# Patient Record
Sex: MALE | Race: WHITE | Employment: UNEMPLOYED | URBAN - METROPOLITAN AREA
[De-identification: names, ages, dates, MRNs, and addresses within clinical notes are randomized per-mention and may not be internally consistent; named-entity substitution may affect disease eponyms.]

---

## 2021-01-26 ENCOUNTER — HOSPITAL ENCOUNTER (EMERGENCY)
Facility: CLINIC | Age: 46
Discharge: HOME | End: 2021-01-26
Attending: EMERGENCY MEDICINE | Admitting: EMERGENCY MEDICINE
Payer: COMMERCIAL

## 2021-01-26 VITALS
SYSTOLIC BLOOD PRESSURE: 131 MMHG | TEMPERATURE: 97.5 F | OXYGEN SATURATION: 100 % | RESPIRATION RATE: 16 BRPM | DIASTOLIC BLOOD PRESSURE: 89 MMHG | WEIGHT: 164 LBS | HEART RATE: 71 BPM

## 2021-01-26 DIAGNOSIS — I70.201 POPLITEAL ARTERY OCCLUSION, RIGHT (H): ICD-10-CM

## 2021-01-26 DIAGNOSIS — M79.662 PAIN OF LEFT LOWER LEG: ICD-10-CM

## 2021-01-26 LAB
ANION GAP SERPL CALCULATED.3IONS-SCNC: 4 MMOL/L (ref 3–14)
BASOPHILS # BLD AUTO: 0.1 10E9/L (ref 0–0.2)
BASOPHILS NFR BLD AUTO: 0.9 %
BUN SERPL-MCNC: 18 MG/DL (ref 7–30)
CALCIUM SERPL-MCNC: 9.4 MG/DL (ref 8.5–10.1)
CHLORIDE SERPL-SCNC: 103 MMOL/L (ref 94–109)
CO2 SERPL-SCNC: 29 MMOL/L (ref 20–32)
CREAT SERPL-MCNC: 0.94 MG/DL (ref 0.66–1.25)
DIFFERENTIAL METHOD BLD: ABNORMAL
EOSINOPHIL # BLD AUTO: 0.1 10E9/L (ref 0–0.7)
EOSINOPHIL NFR BLD AUTO: 0.7 %
ERYTHROCYTE [DISTWIDTH] IN BLOOD BY AUTOMATED COUNT: 12 % (ref 10–15)
GFR SERPL CREATININE-BSD FRML MDRD: >90 ML/MIN/{1.73_M2}
GLUCOSE SERPL-MCNC: 90 MG/DL (ref 70–99)
HCT VFR BLD AUTO: 51.4 % (ref 40–53)
HGB BLD-MCNC: 17.5 G/DL (ref 13.3–17.7)
IMM GRANULOCYTES # BLD: 0 10E9/L (ref 0–0.4)
IMM GRANULOCYTES NFR BLD: 0.3 %
INR PPP: 2.36 (ref 0.86–1.14)
LYMPHOCYTES # BLD AUTO: 3 10E9/L (ref 0.8–5.3)
LYMPHOCYTES NFR BLD AUTO: 25.6 %
MCH RBC QN AUTO: 31.8 PG (ref 26.5–33)
MCHC RBC AUTO-ENTMCNC: 34 G/DL (ref 31.5–36.5)
MCV RBC AUTO: 94 FL (ref 78–100)
MONOCYTES # BLD AUTO: 0.6 10E9/L (ref 0–1.3)
MONOCYTES NFR BLD AUTO: 5 %
NEUTROPHILS # BLD AUTO: 7.8 10E9/L (ref 1.6–8.3)
NEUTROPHILS NFR BLD AUTO: 67.5 %
NRBC # BLD AUTO: 0 10*3/UL
NRBC BLD AUTO-RTO: 0 /100
PLATELET # BLD AUTO: 369 10E9/L (ref 150–450)
POTASSIUM SERPL-SCNC: 4.2 MMOL/L (ref 3.4–5.3)
RBC # BLD AUTO: 5.5 10E12/L (ref 4.4–5.9)
SODIUM SERPL-SCNC: 135 MMOL/L (ref 133–144)
WBC # BLD AUTO: 11.5 10E9/L (ref 4–11)

## 2021-01-26 PROCEDURE — 85610 PROTHROMBIN TIME: CPT | Performed by: EMERGENCY MEDICINE

## 2021-01-26 PROCEDURE — 99283 EMERGENCY DEPT VISIT LOW MDM: CPT | Performed by: EMERGENCY MEDICINE

## 2021-01-26 PROCEDURE — 99284 EMERGENCY DEPT VISIT MOD MDM: CPT | Performed by: EMERGENCY MEDICINE

## 2021-01-26 PROCEDURE — 85025 COMPLETE CBC W/AUTO DIFF WBC: CPT | Performed by: EMERGENCY MEDICINE

## 2021-01-26 PROCEDURE — 80048 BASIC METABOLIC PNL TOTAL CA: CPT | Performed by: EMERGENCY MEDICINE

## 2021-01-26 RX ORDER — CITALOPRAM HYDROBROMIDE 10 MG/1
10 TABLET ORAL DAILY
COMMUNITY

## 2021-01-26 RX ORDER — WARFARIN SODIUM 10 MG/1
20 TABLET ORAL DAILY
COMMUNITY

## 2021-01-26 RX ORDER — ATORVASTATIN CALCIUM 40 MG/1
40 TABLET, FILM COATED ORAL DAILY
COMMUNITY

## 2021-01-26 RX ORDER — HYDROXYZINE HYDROCHLORIDE 50 MG/1
50 TABLET, FILM COATED ORAL 3 TIMES DAILY PRN
COMMUNITY

## 2021-01-26 ASSESSMENT — ENCOUNTER SYMPTOMS: SHORTNESS OF BREATH: 0

## 2021-01-26 NOTE — ED TRIAGE NOTES
Patient states he has a blood clot in left leg  On 1/2 at hospital in [place], [place]. Was seen today at [facility], who wanted to do CT scan with contrast. Pt allergic to dye and was told to  Benadryl and have someone else do CT scan. Patient presents here for evaluation.

## 2021-01-26 NOTE — DISCHARGE INSTRUCTIONS
Your coumadin level today is stable at 2.36.    Please follow up to have the CT abdomen/pelvis with leg run-off done as recommended by vascular surgery.   The order was placed at the [facility] facility.     Please make an appointment to follow up with [facility] (Blood clotting disorder clinic) (phone: [phone]) in 3-5 days regarding your coumadin management.    Please call the [facility] vascular surgery clinic at [phone] to arrange a follow up appointment if you decide not to receive care at [facility].

## 2021-01-26 NOTE — ED PROVIDER NOTES
"ED Provider Note  [facility]      History     Chief Complaint   Patient presents with     Deep Vein Thrombosis     The history is provided by the patient and medical records.     Vlad Parsons is a 45 year old male s/p bypass graft (2020) with a medical history significant for artery disease with recent popliteal artery occlusion requiring hospitalization for thrombolysis and thrombectomy (October 2020) who presents to the ER for evaluation of a blood clot in his lower left extremity.  He was seen today at the heart and vascular clinic who wanted to do a CT scan with contrast.  Patient is allergic to dye and was told to  Benadryl and have \"someone else do CT scan.\"  He presents here for evaluation. Patient states that his primary care was in [place]. He was having issues with a blood clot in his left leg and had surgery a year and a half ago. After this this surgery he received shots in his stomach. He states that his leg still was not getting better after this surgery.  He had solution run through his veins and his vessels cleaned out.  He states it did not seem to be healing up and he could not get answers from his primary care in [place] so he started going up [place] for his primary care.  The first time he went to his new primary care they did a scan on his leg and did not find any clots.  Per his last appointment there (1/20/2021) ultrasound results showed a blood clot behind his left knee.  [facility] but states when he received the paperwork it had [facility] listed.  Patient states he was seen at the heart and vascular clinic at [facility] today who instructed him to get a CT scan done elsewhere due to his allergy to contrast dye.  Patient states that the bottom of his foot hurts and he has tightness \"like a cinthia horse\" behind his left calf when he uses it too much.  He denies swelling or redness in his leg.  Patient is still on Coumadin " and has not missed any doses.  He was previously on 10 mg of warfarin and most recently bumped up to 20 on 2021.  Patient denies any chest pain or difficulty breathing.  Patient has a history of smoking but quit about a month ago.  Previous to quitting, patient was smoking around 2 to 3 cigarettes/day.  Patient took 15 mg of Coumadin this morning at 6:30 AM and 20 mg last night at about 6 PM.     Per chart review, patient was seen at [facility] earlier today (2021).  Per this appointment, patient was given a prescription for premedication due to contrast allergy for a CT angiography of the abdomen and pelvis. Pt was told to follow-up for outpatient CT of his abd/pelvis/leg and than to follow up for surgical planning regarding clot and ongoing left foot rest pain.     Previous to this, patient was last seen on 21 at [facility]. His warfarin dose was adjusted from 15 mg to 20 mg due to INR trending low despite compliance to warfarin prescription regimen. Patient was encouraged to schedule an appointment for initial warfarin education and return appointment for INR check.       Past Medical History:   Diagnosis Date     Anxiety      Hyperlipidemia        Past Surgical History:   Procedure Laterality Date     VASCULAR SURGERY         History reviewed. No pertinent family history.    Social History     Tobacco Use     Smoking status: Former Smoker     Types: Cigarettes     Quit date: 2020     Years since quittin.0     Smokeless tobacco: Never Used   Substance Use Topics     Alcohol use: Not on file     Comment: rarely. 1 drink per month       No current facility-administered medications for this encounter.      Current Outpatient Medications   Medication     atorvastatin (LIPITOR) 40 MG tablet     citalopram (CELEXA) 10 MG tablet     hydrOXYzine (ATARAX) 50 MG tablet     warfarin ANTICOAGULANT (COUMADIN) 10 MG tablet         Allergies   Allergen Reactions     Contrast Dye Hives and Itching     Codeine        Review of Systems   Respiratory: Negative for shortness of breath.    Cardiovascular: Negative for chest pain and leg swelling.   Musculoskeletal:        Positive for left foot pain and calf tightness   All other systems reviewed and are negative.      Physical Exam   BP: 117/74  Pulse: 82  Temp: 97.5  F (36.4  C)  Resp: 16  Weight: 74.4 kg (164 lb)  SpO2: 97 %  Physical Exam  Constitutional:       Appearance: He is well-developed.   HENT:      Head: Normocephalic and atraumatic.   Neck:      Musculoskeletal: Normal range of motion and neck supple.   Cardiovascular:      Rate and Rhythm: Normal rate and regular rhythm.      Heart sounds: Normal heart sounds.   Pulmonary:      Effort: Pulmonary effort is normal. No respiratory distress.      Breath sounds: No wheezing.   Abdominal:      General: There is no distension.      Palpations: Abdomen is soft.      Tenderness: There is no abdominal tenderness. There is no rebound.   Musculoskeletal:      Right lower leg: No edema.      Left lower leg: No edema.      Comments: Normal distal pulses on left foot; normal temperature; no skin changes; old surgical scar on left calf;  No edema or erythema of left leg calf    Skin:     General: Skin is warm.   Neurological:      General: No focal deficit present.      Mental Status: He is alert and oriented to person, place, and time.      Cranial Nerves: No cranial nerve deficit.      Sensory: No sensory deficit.      Motor: No weakness.   Psychiatric:         Behavior: Behavior normal.         Thought Content: Thought content normal.          ED Course      Procedures         11:25 AM  The patient was seen and examined by Vlad Parsons MD, in Room ED17.                    Results for orders placed or performed during the hospital encounter of 01/26/21   Basic metabolic panel     Status: None   Result Value Ref Range    Sodium 135 133 -  144 mmol/L    Potassium 4.2 3.4 - 5.3 mmol/L    Chloride 103 94 - 109 mmol/L    Carbon Dioxide 29 20 - 32 mmol/L    Anion Gap 4 3 - 14 mmol/L    Glucose 90 70 - 99 mg/dL    Urea Nitrogen 18 7 - 30 mg/dL    Creatinine 0.94 0.66 - 1.25 mg/dL    GFR Estimate >90 >60 mL/min/[1.73_m2]    GFR Estimate If Black >90 >60 mL/min/[1.73_m2]    Calcium 9.4 8.5 - 10.1 mg/dL   CBC with platelets differential     Status: Abnormal   Result Value Ref Range    WBC 11.5 (H) 4.0 - 11.0 10e9/L    RBC Count 5.50 4.4 - 5.9 10e12/L    Hemoglobin 17.5 13.3 - 17.7 g/dL    Hematocrit 51.4 40.0 - 53.0 %    MCV 94 78 - 100 fl    MCH 31.8 26.5 - 33.0 pg    MCHC 34.0 31.5 - 36.5 g/dL    RDW 12.0 10.0 - 15.0 %    Platelet Count 369 150 - 450 10e9/L    Diff Method Automated Method     % Neutrophils 67.5 %    % Lymphocytes 25.6 %    % Monocytes 5.0 %    % Eosinophils 0.7 %    % Basophils 0.9 %    % Immature Granulocytes 0.3 %    Nucleated RBCs 0 0 /100    Absolute Neutrophil 7.8 1.6 - 8.3 10e9/L    Absolute Lymphocytes 3.0 0.8 - 5.3 10e9/L    Absolute Monocytes 0.6 0.0 - 1.3 10e9/L    Absolute Eosinophils 0.1 0.0 - 0.7 10e9/L    Absolute Basophils 0.1 0.0 - 0.2 10e9/L    Abs Immature Granulocytes 0.0 0 - 0.4 10e9/L    Absolute Nucleated RBC 0.0    INR     Status: Abnormal   Result Value Ref Range    INR 2.36 (H) 0.86 - 1.14     Medications - No data to display       Results for orders placed or performed during the hospital encounter of 01/26/21   Basic metabolic panel     Status: None   Result Value Ref Range    Sodium 135 133 - 144 mmol/L    Potassium 4.2 3.4 - 5.3 mmol/L    Chloride 103 94 - 109 mmol/L    Carbon Dioxide 29 20 - 32 mmol/L    Anion Gap 4 3 - 14 mmol/L    Glucose 90 70 - 99 mg/dL    Urea Nitrogen 18 7 - 30 mg/dL    Creatinine 0.94 0.66 - 1.25 mg/dL    GFR Estimate >90 >60 mL/min/[1.73_m2]    GFR Estimate If Black >90 >60 mL/min/[1.73_m2]    Calcium 9.4 8.5 - 10.1 mg/dL   CBC with platelets differential     Status: Abnormal   Result  Value Ref Range    WBC 11.5 (H) 4.0 - 11.0 10e9/L    RBC Count 5.50 4.4 - 5.9 10e12/L    Hemoglobin 17.5 13.3 - 17.7 g/dL    Hematocrit 51.4 40.0 - 53.0 %    MCV 94 78 - 100 fl    MCH 31.8 26.5 - 33.0 pg    MCHC 34.0 31.5 - 36.5 g/dL    RDW 12.0 10.0 - 15.0 %    Platelet Count 369 150 - 450 10e9/L    Diff Method Automated Method     % Neutrophils 67.5 %    % Lymphocytes 25.6 %    % Monocytes 5.0 %    % Eosinophils 0.7 %    % Basophils 0.9 %    % Immature Granulocytes 0.3 %    Nucleated RBCs 0 0 /100    Absolute Neutrophil 7.8 1.6 - 8.3 10e9/L    Absolute Lymphocytes 3.0 0.8 - 5.3 10e9/L    Absolute Monocytes 0.6 0.0 - 1.3 10e9/L    Absolute Eosinophils 0.1 0.0 - 0.7 10e9/L    Absolute Basophils 0.1 0.0 - 0.2 10e9/L    Abs Immature Granulocytes 0.0 0 - 0.4 10e9/L    Absolute Nucleated RBC 0.0    INR     Status: Abnormal   Result Value Ref Range    INR 2.36 (H) 0.86 - 1.14     Medications - No data to display     Assessments & Plan (with Medical Decision Making)     Mr. Vlad Parsons is a very nice middle-aged man who presents to the ER due to ongoing issues.  Per review of patient's medical chart he he has an ongoing clot in his popliteal artery.  He he has surgery in the past.  Patient was seen today by the Vascular Surgeon at [facility].  They recommended that patient have a CT abdomen pelvis with runoffs of his legs so that they can plan for surgery as needed.  Patient ended up coming to the ER because he was confused about where he supposed to go to get his CT.  Patient has feeling in his foot and his foot remains warm.  There is no acute vascular emergency at this time.  I did recommend that he follow-up for his outpatient CT as ordered by the Vascular surgeon.  Since patient needs to be pretreated with steroids and Benadryl I do not recommend getting the CT at this time from the ER.  We did check some basic labs and an INR and his INR is therapeutic.  Had a long discussion with the patient and the  wife regarding ongoing treatment of his legs.  They have been frustrated since they've had this issue for several years and they feel like it is not being completely treated.  I did recommend the patient follow-up with Vascular Surgery either here at the [facility] and as well as with the Clotting Disorder clinic to see if Coumadin is an appropriate medication for him.  Patient had issues with having his Coumadin level be in appropriate parameters.  Patient might be more of an appropriate candidate for one of the newer DOAC treatment agents.  Patient arranged for follow-up in clinic for further recommendations regarding anticoagulation.  Patient at this time has stable vital signs.  He has no signs of PE.  Patient will be discharged home.  I did discuss the case with Social Work who is trying to get the care coordinator down to evaluate the patient.  Patient however left the ER before receiving the care coordination follow-up instructions.    This part of the document was transcribed by Hellen Sanches Medical Scribe.      I have reviewed the nursing notes. I have reviewed the findings, diagnosis, plan and need for follow up with the patient.    Discharge Medication List as of 1/26/2021  1:51 PM          Final diagnoses:   Popliteal artery occlusion, right (H)   Pain of left lower leg     I, Tiffanie Pepe, am serving as a trained medical scribe to document services personally performed by Madhuri Valdes MD, based on the provider's statements to me.      IMadhuri MD, was physically present and have reviewed and verified the accuracy of this note documented by Tiffanie Pepe.   --  Madhuri Valdes MD  [facility] EMERGENCY DEPARTMENT  1/26/2021     Madhuri Valdes MD  01/26/21 [id number]